# Patient Record
Sex: MALE | Race: WHITE | HISPANIC OR LATINO | Employment: FULL TIME | URBAN - METROPOLITAN AREA
[De-identification: names, ages, dates, MRNs, and addresses within clinical notes are randomized per-mention and may not be internally consistent; named-entity substitution may affect disease eponyms.]

---

## 2017-07-05 ENCOUNTER — HOSPITAL ENCOUNTER (OUTPATIENT)
Facility: MEDICAL CENTER | Age: 22
End: 2017-07-05
Attending: NURSE PRACTITIONER
Payer: COMMERCIAL

## 2017-07-05 ENCOUNTER — OFFICE VISIT (OUTPATIENT)
Dept: URGENT CARE | Facility: PHYSICIAN GROUP | Age: 22
End: 2017-07-05
Payer: COMMERCIAL

## 2017-07-05 VITALS
HEART RATE: 110 BPM | TEMPERATURE: 98.6 F | SYSTOLIC BLOOD PRESSURE: 124 MMHG | DIASTOLIC BLOOD PRESSURE: 78 MMHG | HEIGHT: 68 IN | WEIGHT: 137 LBS | BODY MASS INDEX: 20.76 KG/M2 | OXYGEN SATURATION: 97 %

## 2017-07-05 DIAGNOSIS — L72.9 CYST OF BUTTOCKS: ICD-10-CM

## 2017-07-05 PROCEDURE — 87186 SC STD MICRODIL/AGAR DIL: CPT

## 2017-07-05 PROCEDURE — 87205 SMEAR GRAM STAIN: CPT

## 2017-07-05 PROCEDURE — 10060 I&D ABSCESS SIMPLE/SINGLE: CPT | Performed by: NURSE PRACTITIONER

## 2017-07-05 PROCEDURE — 99214 OFFICE O/P EST MOD 30 MIN: CPT | Mod: 25 | Performed by: NURSE PRACTITIONER

## 2017-07-05 PROCEDURE — 87077 CULTURE AEROBIC IDENTIFY: CPT

## 2017-07-05 PROCEDURE — 87070 CULTURE OTHR SPECIMN AEROBIC: CPT

## 2017-07-05 RX ORDER — CLINDAMYCIN HYDROCHLORIDE 300 MG/1
300 CAPSULE ORAL 3 TIMES DAILY
Qty: 21 CAP | Refills: 0 | Status: SHIPPED | OUTPATIENT
Start: 2017-07-05 | End: 2017-07-12

## 2017-07-05 ASSESSMENT — ENCOUNTER SYMPTOMS: FEVER: 0

## 2017-07-05 NOTE — MR AVS SNAPSHOT
"Arthur Hernandez   2017 11:55 AM   Office Visit   MRN: [id number]    Department:  [facility]   Dept Phone:  [phone]    Description:  Male : 1995   Provider:  EDITH Hogue           Reason for Visit     Cyst Cyst on righ hip and groin/painful/red x3-4 days       Allergies as of 2017     No Known Allergies      You were diagnosed with     Cyst of buttocks   [580260]         Vital Signs     Blood Pressure Pulse Temperature Height Weight Body Mass Index    124/78 mmHg 110 37 °C (98.6 °F) 1.727 m (5' 8\") 62.143 kg (137 lb) 20.84 kg/m2    Oxygen Saturation                   97%           Basic Information     Date Of Birth Sex Race Ethnicity Preferred Language    1995 Male White  Origin (Albanian,Estonian,Singaporean,Icelandic, etc) English      Health Maintenance        Date Due Completion Dates    IMM HPV VACCINE (1 of 3 - Male 3 Dose Series) 2006 ---    IMM VARICELLA (CHICKENPOX) VACCINE (1 of 2 - 2 Dose Adolescent Series) 2008 ---    IMM HEP A VACCINE (2 of 2 - Standard Series) 2010 10/2/2009    IMM INFLUENZA (1) 2017 ---    IMM DTaP/Tdap/Td Vaccine (7 - Td) 10/2/2019 10/2/2009, 1999, 1996, 1995, 1995, 1995            Current Immunizations     Dtap Vaccine 1999, 1996, 1995, 1995, 1995    HIB Vaccine (ACTHIB/HIBERIX) 1995, 1995, 1995    Hepatitis A Vaccine, Ped/Adol 10/2/2009    Hepatitis B Vaccine Non-Recombivax (Ped/Adol) 1996, 1995, 1995    MMR Vaccine 1999, 1996    Meningococcal Conjugate Vaccine MCV4 (Menactra) 10/2/2009    OPV - Historical Data 1999, 1995, 1995, 1995    Tdap Vaccine 10/2/2009      Below and/or attached are the medications your provider expects you to take. Review all of your home medications and newly ordered medications with your provider and/or pharmacist. Follow medication instructions as directed by your provider " and/or pharmacist. Please keep your medication list with you and share with your provider. Update the information when medications are discontinued, doses are changed, or new medications (including over-the-counter products) are added; and carry medication information at all times in the event of emergency situations     Allergies:  No Known Allergies          Medications  Valid as of: July 05, 2017 -  1:48 PM    Generic Name Brand Name Tablet Size Instructions for use    Acyclovir (Tab) ZOVIRAX 400 MG Take 1 Tab by mouth 5 Times a Day.        Albuterol   Inhale  by mouth as needed.        Albuterol Sulfate (Aero Soln) albuterol 108 (90 BASE) MCG/ACT Inhale 1-2 Puffs by mouth every 6 hours as needed for Shortness of Breath.        Clindamycin HCl (Cap) CLEOCIN 300 MG Take 1 Cap by mouth 3 times a day for 7 days.        .                 Medicines prescribed today were sent to:     [id number] - [place], [place] - [address] [facility]    [address] [place] [place] [address]    Phone: [phone] Fax: [phone]    Open 24 Hours?: No    [id number] - [place], [place] - [address]    [address] [place] [address]    Phone: [phone] Fax: [phone]    Open 24 Hours?: No      Medication refill instructions:       If your prescription bottle indicates you have medication refills left, it is not necessary to call your provider’s office. Please contact your pharmacy and they will refill your medication.    If your prescription bottle indicates you do not have any refills left, you may request refills at any time through one of the following ways: The online [facility] system (except Urgent Care), by calling your provider’s office, or by asking your pharmacy to contact your provider’s office with a refill request. Medication refills are processed only during regular business hours and may not be available until the next business day. Your provider may request additional  information or to have a follow-up visit with you prior to refilling your medication.   *Please Note: Medication refills are assigned a new Rx number when refilled electronically. Your pharmacy may indicate that no refills were authorized even though a new prescription for the same medication is available at the pharmacy. Please request the medicine by name with the pharmacy before contacting your provider for a refill.        Your To Do List     Future Labs/Procedures Complete By Expires    CULTURE WOUND W/ GRAM STAIN  As directed 7/5/2018         Code Scouts Access Code: [email or web address]  Expires: 8/4/2017  1:48 PM    Code Scouts  A secure, online tool to manage your health information     [employer]’s Code Scouts® is a secure, online tool that connects you to your personalized health information from the privacy of your home -- day or night - making it very easy for you to manage your healthcare. Once the activation process is completed, you can even access your medical information using the Code Scouts sue, which is available for free in the Apple Sue store or Google Play store.     Code Scouts provides the following levels of access (as shown below):   My Chart Features   [facility] Doctor [facility]  Specialists [facility]  Urgent  Care [facility]  Primary Care  Doctor   Email your healthcare team securely and privately 24/7 X X X    Manage appointments: schedule your next appointment; view details of past/upcoming appointments X      Request prescription refills. X      View recent personal medical records, including lab and immunizations X X X X   View health record, including health history, allergies, medications X X X X   Read reports about your outpatient visits, procedures, consult and ER notes X X X X   See your discharge summary, which is a recap of your hospital and/or ER visit that includes your diagnosis, lab results, and care plan. X X       How to register for Code Scouts:  1. Go to   [email or web address].  2. Click on the Sign Up Now box, which takes you to the New Member Sign Up page. You will need to provide the following information:  a. Enter your [employer] Access Code exactly as it appears at the top of this page. (You will not need to use this code after you’ve completed the sign-up process. If you do not sign up before the expiration date, you must request a new code.)   b. Enter your date of birth.   c. Enter your home email address.   d. Click Submit, and follow the next screen’s instructions.  3. Create a [employer] ID. This will be your [employer] login ID and cannot be changed, so think of one that is secure and easy to remember.  4. Create a [employer] password. You can change your password at any time.  5. Enter your Password Reset Question and Answer. This can be used at a later time if you forget your password.   6. Enter your e-mail address. This allows you to receive e-mail notifications when new information is available in [employer].  7. Click Sign Up. You can now view your health information.    For assistance activating your [employer] account, call [phone]

## 2017-07-05 NOTE — Clinical Note
July 5, 2017         Patient: Arthur Hernandez   YOB: 1995   Date of Visit: 7/5/2017           To Whom it May Concern:    Arthur Hernandez was seen in my clinic on 7/5/2017. Please excuse him from work today.        Sincerely,           SANDER Hogue.  Electronically Signed

## 2017-07-05 NOTE — PROGRESS NOTES
"Subjective:      Arthur Hernandez is a 22 y.o. male who presents with Cyst            Cyst  This is a new problem. Episode onset: reports pain and swelling started 4 days ago to right buttock. It is now hard and more painful. Denies any drainage from the area. The problem occurs constantly. The problem has been gradually worsening. Pertinent negatives include no fever. Exacerbated by: It is especially irritating when he sits for long periods of time. He has tried nothing for the symptoms.       Review of Systems   Constitutional: Negative for fever.   Skin:        Cyst to right buttock   All other systems reviewed and are negative.    Past Medical History   Diagnosis Date   • Premature birth      33 wks, ventilator x 2 wks   • Twin birth, mate liveborn    • Asthma dx'd at 1 yr    No past surgical history on file.   Social History     Social History   • Marital Status: Single     Spouse Name: N/A   • Number of Children: N/A   • Years of Education: N/A     Occupational History   • Not on file.     Social History Main Topics   • Smoking status: Not on file   • Smokeless tobacco: Not on file   • Alcohol Use: Not on file   • Drug Use: Not on file   • Sexual Activity: Not on file     Other Topics Concern   • Not on file     Social History Narrative    ** Merged History Encounter **          parents, step parents, 2 sisters, public school          Objective:     /78 mmHg  Pulse 110  Temp(Src) 37 °C (98.6 °F)  Ht 1.727 m (5' 8\")  Wt 62.143 kg (137 lb)  BMI 20.84 kg/m2  SpO2 97%     Physical Exam   Constitutional: He is oriented to person, place, and time. Vital signs are normal. He appears well-developed and well-nourished.   HENT:   Head: Normocephalic and atraumatic.   Eyes: EOM are normal. Pupils are equal, round, and reactive to light.   Neck: Normal range of motion.   Cardiovascular: Normal rate and regular rhythm.    Pulmonary/Chest: Effort normal.   Musculoskeletal: Normal range of motion. "   Neurological: He is alert and oriented to person, place, and time.   Skin: Skin is warm and dry.        Psychiatric: He has a normal mood and affect. His speech is normal and behavior is normal. Thought content normal.   Vitals reviewed.         Procedure: Incision and Drainage  -Risks, benefits, and alternatives discussed. Risks including infection, bleeding, nerve damage, and poor cosmetic outcome  -Sterile technique throughout  -Local anesthesia with 2% lidocaine with epinephrine  -Incision with #11 blade into fluctuant area with purulent material expressed  -Culture obtained and packaged for lab  -Cavity probed and any loculations bluntly taken down with hemostat  -Irrigated copiously with NS  -Minimal bleeding with good hemostasis achieved  -The patient tolerated the procedure well       Assessment/Plan:     1. Cyst of buttocks  - clindamycin (CLEOCIN) 300 MG Cap; Take 1 Cap by mouth 3 times a day for 7 days.  Dispense: 21 Cap; Refill: 0  - CULTURE WOUND W/ GRAM STAIN; Future    Warm compresses 2-3 times per day to encourage drainage  Tylenol and Ibuprofen PRN pain  May apply bandage PRN drainage  Supportive care, differential diagnoses, and indications for immediate follow-up discussed with patient.    Pathogenesis of diagnosis discussed including typical length and natural progression.      Instructed to return to  or nearest emergency department if symptoms fail to improve, for any change in condition, further concerns, or new concerning symptoms.  Patient states understanding of the plan of care and discharge instructions.

## 2017-07-06 ENCOUNTER — OFFICE VISIT (OUTPATIENT)
Dept: URGENT CARE | Facility: PHYSICIAN GROUP | Age: 22
End: 2017-07-06
Payer: COMMERCIAL

## 2017-07-06 VITALS
BODY MASS INDEX: 20.84 KG/M2 | TEMPERATURE: 99 F | DIASTOLIC BLOOD PRESSURE: 82 MMHG | SYSTOLIC BLOOD PRESSURE: 124 MMHG | OXYGEN SATURATION: 96 % | WEIGHT: 137 LBS | HEART RATE: 77 BPM

## 2017-07-06 DIAGNOSIS — L02.91 ABSCESS: ICD-10-CM

## 2017-07-06 LAB
GRAM STN SPEC: NORMAL
SIGNIFICANT IND 70042: NORMAL
SITE SITE: NORMAL
SOURCE SOURCE: NORMAL

## 2017-07-06 PROCEDURE — 99024 POSTOP FOLLOW-UP VISIT: CPT | Performed by: FAMILY MEDICINE

## 2017-07-06 NOTE — Clinical Note
July 6, 2017         Patient: Arthur Hernandez   YOB: 1995   Date of Visit: 7/6/2017           To Whom it May Concern:    Arthur Hernandez was seen in my clinic on 7/6/2017. He may return to work on Monday.    If you have any questions or concerns, please don't hesitate to call.        Sincerely,           Easton Sanchez M.D.  Electronically Signed

## 2017-07-08 LAB
BACTERIA WND AEROBE CULT: ABNORMAL
GRAM STN SPEC: ABNORMAL
SIGNIFICANT IND 70042: ABNORMAL
SITE SITE: ABNORMAL
SOURCE SOURCE: ABNORMAL

## 2017-07-10 ENCOUNTER — TELEPHONE (OUTPATIENT)
Dept: URGENT CARE | Facility: PHYSICIAN GROUP | Age: 22
End: 2017-07-10

## 2017-07-10 NOTE — TELEPHONE ENCOUNTER
1. Caller Name: Lab                                              Patient approves a detailed voicemail message: N\A    Lab called to report critical results, positive wound culture. Please advise.

## 2017-07-13 NOTE — PROGRESS NOTES
Subjective:      Chief Complaint   Patient presents with   • Wound check                  S/p I+D pilonidal cyst.  Taking abx as prescribed.  Pain is improving.        Pertinent negatives include no cough, fatigue, fever, shortness of breath or sore throat.      Past Medical History   Diagnosis Date   • Premature birth      33 wks, ventilator x 2 wks   • Twin birth, mate liveborn    • Asthma dx'd at 1 yr         Social History   Substance Use Topics   • Smoking status: Never Smoker    • Smokeless tobacco: None   • Alcohol Use: None             Review of Systems   Constitutional: Negative for fever and fatigue.   HENT: Negative for sore throat.    Respiratory: Negative for cough and shortness of breath.    Skin: Positive for nodule.   All other systems reviewed and are negative.         Objective:   Blood pressure 124/82, pulse 77, temperature 37.2 °C (99 °F), weight 62.143 kg (137 lb), SpO2 96 %.      Physical Exam   Constitutional: pt is oriented to person, place, and time. Pt appears well-developed and well-nourished. No distress.   HENT:   Head: Normocephalic and atraumatic.   Mouth/Throat: Oropharynx is clear and moist and mucous membranes are normal. No uvula swelling. No oropharyngeal exudate, posterior oropharyngeal edema or posterior oropharyngeal erythema.   Eyes: Conjunctivae are normal.   Cardiovascular: Normal rate, regular rhythm and normal heart sounds.    Pulmonary/Chest: Effort normal and breath sounds normal. No respiratory distress. She has no wheezes.   Neurological: pt is alert and oriented to person, place, and time. No cranial nerve deficit.   Skin: healing abscess cavity, superior gluteal cleft. . Pt is not diaphoretic.    Psychiatric: pt's behavior is normal.   Nursing note and vitals reviewed.              Assessment/Plan:      abscess    Resolving  Await wound cx  Cont. Abx

## 2024-08-07 PROBLEM — S62.334A CLOSED DISPLACED FRACTURE OF NECK OF RIGHT FOURTH METACARPAL BONE: Status: ACTIVE | Noted: 2024-08-07

## 2024-08-07 PROBLEM — S62.336A CLOSED DISPLACED FRACTURE OF NECK OF RIGHT FIFTH METACARPAL BONE: Status: ACTIVE | Noted: 2024-08-07

## 2025-08-17 ENCOUNTER — OFFICE VISIT (OUTPATIENT)
Dept: URGENT CARE | Facility: CLINIC | Age: 30
End: 2025-08-17

## 2025-08-17 VITALS
HEART RATE: 78 BPM | SYSTOLIC BLOOD PRESSURE: 102 MMHG | BODY MASS INDEX: 22.76 KG/M2 | HEIGHT: 67 IN | TEMPERATURE: 97 F | DIASTOLIC BLOOD PRESSURE: 60 MMHG | WEIGHT: 145 LBS | OXYGEN SATURATION: 96 % | RESPIRATION RATE: 16 BRPM

## 2025-08-17 DIAGNOSIS — J03.90 EXUDATIVE TONSILLITIS: Primary | ICD-10-CM

## 2025-08-17 LAB
HETEROPH AB SER QL LA: NEGATIVE
POCT INT CON NEG: NEGATIVE
POCT INT CON POS: POSITIVE

## 2025-08-17 PROCEDURE — 99203 OFFICE O/P NEW LOW 30 MIN: CPT

## 2025-08-17 PROCEDURE — 86308 HETEROPHILE ANTIBODY SCREEN: CPT

## 2025-08-17 RX ORDER — AMOXICILLIN 500 MG/1
500 CAPSULE ORAL 2 TIMES DAILY
Qty: 20 CAPSULE | Refills: 0 | Status: CANCELLED | OUTPATIENT
Start: 2025-08-17 | End: 2025-08-27

## 2025-08-17 RX ORDER — AMOXICILLIN 500 MG/1
500 CAPSULE ORAL 2 TIMES DAILY
Qty: 20 CAPSULE | Refills: 0 | Status: SHIPPED | OUTPATIENT
Start: 2025-08-17 | End: 2025-08-27

## 2025-08-17 ASSESSMENT — LIFESTYLE VARIABLES
AUDIT-C TOTAL SCORE: 0
HOW OFTEN DO YOU HAVE SIX OR MORE DRINKS ON ONE OCCASION: NEVER
SKIP TO QUESTIONS 9-10: 1
HOW MANY STANDARD DRINKS CONTAINING ALCOHOL DO YOU HAVE ON A TYPICAL DAY: PATIENT DOES NOT DRINK
HOW OFTEN DO YOU HAVE A DRINK CONTAINING ALCOHOL: NEVER